# Patient Record
Sex: FEMALE | Race: WHITE
[De-identification: names, ages, dates, MRNs, and addresses within clinical notes are randomized per-mention and may not be internally consistent; named-entity substitution may affect disease eponyms.]

---

## 2017-02-22 ENCOUNTER — HOSPITAL ENCOUNTER (EMERGENCY)
Dept: HOSPITAL 75 - ER | Age: 27
Discharge: HOME | End: 2017-02-22
Payer: SELF-PAY

## 2017-02-22 VITALS — HEIGHT: 66 IN | WEIGHT: 180 LBS | BODY MASS INDEX: 28.93 KG/M2

## 2017-02-22 VITALS — SYSTOLIC BLOOD PRESSURE: 109 MMHG | DIASTOLIC BLOOD PRESSURE: 56 MMHG

## 2017-02-22 DIAGNOSIS — R10.11: ICD-10-CM

## 2017-02-22 DIAGNOSIS — K29.70: Primary | ICD-10-CM

## 2017-02-22 DIAGNOSIS — F17.210: ICD-10-CM

## 2017-02-22 LAB
ALBUMIN SERPL-MCNC: 4 G/DL (ref 3.2–4.5)
ALT SERPL-CCNC: 15 U/L (ref 0–55)
ANION GAP SERPL CALC-SCNC: 12 MMOL/L (ref 5–14)
AST SERPL-CCNC: 14 U/L (ref 5–34)
BASOPHILS # BLD AUTO: 0 10^3/UL (ref 0–0.1)
BASOPHILS NFR BLD AUTO: 0 % (ref 0–10)
BILIRUB SERPL-MCNC: 0.2 MG/DL (ref 0.1–1)
BILIRUB UR QL STRIP: NEGATIVE
BUN SERPL-MCNC: 14 MG/DL (ref 7–18)
BUN/CREAT SERPL: 16
CALCIUM SERPL-MCNC: 8.9 MG/DL (ref 8.5–10.1)
CHLORIDE SERPL-SCNC: 108 MMOL/L (ref 98–107)
CO2 SERPL-SCNC: 22 MMOL/L (ref 21–32)
CREAT SERPL-MCNC: 0.88 MG/DL (ref 0.6–1.3)
EOSINOPHIL # BLD AUTO: 0.1 10^3/UL (ref 0–0.3)
EOSINOPHIL NFR BLD AUTO: 2 % (ref 0–10)
ERYTHROCYTE [DISTWIDTH] IN BLOOD BY AUTOMATED COUNT: 12.6 % (ref 10–14.5)
GFR SERPLBLD BASED ON 1.73 SQ M-ARVRAT: > 60 ML/MIN
GLUCOSE SERPL-MCNC: 89 MG/DL (ref 70–105)
HS C REACTIVE PROTEIN: 0.17 MG/DL (ref 0–0.5)
KETONES UR QL STRIP: NEGATIVE
LEUKOCYTE ESTERASE UR QL STRIP: NEGATIVE
LIPASE SERPL-CCNC: 19 U/L (ref 8–78)
LYMPHOCYTES # BLD AUTO: 1.9 X 10^3 (ref 1–4)
LYMPHOCYTES NFR BLD AUTO: 36 % (ref 12–44)
MCH RBC QN AUTO: 31 PG (ref 25–34)
MCHC RBC AUTO-ENTMCNC: 35 G/DL (ref 32–36)
MCV RBC AUTO: 90 FL (ref 80–99)
MONOCYTES # BLD AUTO: 0.4 X 10^3 (ref 0–1)
MONOCYTES NFR BLD AUTO: 8 % (ref 0–12)
NEUTROPHILS # BLD AUTO: 3 X 10^3 (ref 1.8–7.8)
NEUTROPHILS NFR BLD AUTO: 54 % (ref 42–75)
NITRITE UR QL STRIP: NEGATIVE
PH UR STRIP: 6 [PH] (ref 5–9)
PLATELET # BLD: 140 10^3/UL (ref 130–400)
PMV BLD AUTO: 12 FL (ref 7.4–10.4)
POTASSIUM SERPL-SCNC: 3.7 MMOL/L (ref 3.6–5)
PROT SERPL-MCNC: 6.5 G/DL (ref 6.4–8.2)
PROT UR QL STRIP: NEGATIVE
RBC # BLD AUTO: 4.58 10^6/UL (ref 4.35–5.85)
SODIUM SERPL-SCNC: 142 MMOL/L (ref 135–145)
SP GR UR STRIP: 1.02 (ref 1.02–1.02)
SQUAMOUS #/AREA URNS HPF: (no result) /HPF
UROBILINOGEN UR-MCNC: NORMAL MG/DL
WBC # BLD AUTO: 5.4 10^3/UL (ref 4.3–11)
WBC #/AREA URNS HPF: (no result) /HPF

## 2017-02-22 PROCEDURE — 86141 C-REACTIVE PROTEIN HS: CPT

## 2017-02-22 PROCEDURE — 96361 HYDRATE IV INFUSION ADD-ON: CPT

## 2017-02-22 PROCEDURE — 87088 URINE BACTERIA CULTURE: CPT

## 2017-02-22 PROCEDURE — 96375 TX/PRO/DX INJ NEW DRUG ADDON: CPT

## 2017-02-22 PROCEDURE — 80053 COMPREHEN METABOLIC PANEL: CPT

## 2017-02-22 PROCEDURE — 85025 COMPLETE CBC W/AUTO DIFF WBC: CPT

## 2017-02-22 PROCEDURE — 81000 URINALYSIS NONAUTO W/SCOPE: CPT

## 2017-02-22 PROCEDURE — 76705 ECHO EXAM OF ABDOMEN: CPT

## 2017-02-22 PROCEDURE — 96374 THER/PROPH/DIAG INJ IV PUSH: CPT

## 2017-02-22 PROCEDURE — 36415 COLL VENOUS BLD VENIPUNCTURE: CPT

## 2017-02-22 PROCEDURE — 83690 ASSAY OF LIPASE: CPT

## 2017-02-22 RX ADMIN — TRAMADOL HYDROCHLORIDE STA MG: 50 TABLET, FILM COATED ORAL at 23:20

## 2017-02-22 NOTE — ED GENERAL
General


Chief Complaint:  Abdominal/GI Problems


Stated Complaint:  RIGHT RIB CAGE PAIN


Nursing Triage Note:  


REPORTS RIGHT LOWER RIB PAIN THAT RADIATES TO BACK ET RIGHT SHOULDER STARTING 


YESTERDAY.  REPORTS THE PAIN IS CONSTANT.  PAIN IMPROVED WHEN LAYING ON RIGHT 


SIDE.  INCREASED HEARTBURN OVER THE WEEKEND.  INCREASED FATIGUE.


Nursing Sepsis Screen:  No Definite Risk


Source of Information:  Patient, Other (friend)


Exam Limitations:  No Limitations





History of Present Illness


Time Seen by Provider:  19:42


Initial Comments


25 yo female patient presents to the ED with c/o right lower rib pain radiating 

into the right shoulder blade and right shoulder.  Also complains of epigastric 

pain radiating straight through to the back.  Patient reports initially having 

symptoms on Saturday with improvement Sunday.  Return of symptoms yesterday.  

Decreased appetite.  Has not had anything to eat since yesterday.  Fatigue and 

chills beginning yesterday.  Denies known injury.


Timing/Duration:  24 Hours, Getting Worse


Modifying Factors:  worse with Eating, worse with Movement, worse with Other (

worse palpation, standing upright, lying flat, and riding in the car.)





Allergies and Home Medications


Allergies


Coded Allergies:  


     clindamycin (Unverified  Allergy, Intermediate, SOA, 8/24/10)


     hepatitis B immune globulin (Unverified  Allergy, Mild, 7/28/09)


     Hepatitis B Virus Vaccine (Verified  Allergy, Unknown, 5/31/06)





Home Medications


Famotidine 20 Mg Tablet #20 20 MG PO BID 


   Prescribed by: DESEAN JIMENEZ on 2/22/17 2117


Lorazepam 0.5 Mg Tablet #30 0.5 MG PO Q12H PRN PRN ANXIETY (Reported) 


Ondansetron 8 Mg Tab.rapdis #10 8 MG PO Q6H PRN PRN NAUSEA 


   Prescribed by: DESEAN JIMENEZ on 2/22/17 2117


Tramadol HCl 50 Mg Tablet #14 50 MG PO Q4H PRN PRN PAIN 


   Prescribed by: DESEAN JIMENEZ on 2/22/17 2117





Constitutional:   chillsNo dizziness, No fever,  malaise other (fatigue)


EENTM:   no symptoms reported


Respiratory:   see HPINo cough, No short of breath, No stridor, No wheezing,  

other ((+) rib pain)


Cardiovascular:   no symptoms reported


Gastrointestinal:   see HPI abdominal pain (epigastric)No constipation, No 

diarrhea,  heartburn loss of appetite nauseaNo vomiting


Genitourinary:  No decreased output, No discharge, No dysuria, No frequency, No 

hematuria, No pain


Musculoskeletal:   see HPI back pain joint painNo joint swelling


Skin:   no symptoms reported


Psychiatric/Neurological:   No Symptoms Reported


All Other Systems Reviewed


Negative Unless Noted:  Yes (Negative excepted noted.)





Past Medical-Social-Family Hx


Patient Social History


Alcohol Use:  Rarely Uses


Recreational Drug Use:  No


Smoking Status:  Current Everyday Smoker


Type Used:  Cigarettes


2nd Hand Smoke Exposure:  No


Recent Foreign Travel:  No


Contact w/Someone Who Travel:  No


Recent Infectious Disease Expo:  No


Recent Hopitalizations:  No





Surgeries


HX Surgeries:  Yes (exp lap when 10 years old; lap appy 12/20/09, LYMPH NODE 

EXCISION)


Surgeries:  Appendectomy, Hysterectomy





Respiratory


Hx Respiratory Disorders:  No





Cardiovascular


Hx Cardiac Disorders:  No





Neurological


Hx Neurological Disorders:  No





Reproductive System


Hx Reproductive Disorders:  Yes (severe endometriosis =>hysterectomy)


Sexually Transmitted Disease:  Yes (gonorrhea, chlamydia 2003 or 2004; treated)


GYN History:  Hysterectomy





Genitourinary


Hx Genitourinary Disorders:  No





Gastrointestinal


Hx Gastrointestinal Disorders:  No





Musculoskeletal


Hx Musculoskeletal Disorders:  No





Endocrine


Hx Endocrine Disorders:  No





HEENT


HX ENT Disorders:  No





Psychosocial


Hx Psychiatric Problems:  No





Blood Transfusions


Hx Blood Disorders:  Yes (thrombocytopenia)


Adverse Reaction to a Blood Tr:  No





Reviewed Nursing Assessment


Reviewed/Agree w Nursing PMH:  Yes





Family Medical History


Significant Family History:  No Pertinent Family Hx





Physical Exam


Vital Signs





 Vital Sign - Last 12Hours








 2/22/17 2/22/17





 19:13 23:22


 


Temp 98.2 


 


Pulse 91 


 


Resp 16 


 


B/P 124/87 


 


Pulse Ox  96





Capillary Refill : Less Than 3 Seconds


General Appearance:   No Apparent Distress WD/WN


HEENT:   PERRL/EOMI Pharynx Normal


Neck:   Normal Inspection Supple


Respiratory:   Chest Non Tender (unable to reproduce rib tenderness.) Lungs 

Clear Normal Breath Sounds No Respiratory Distress


Cardiovascular:   Regular Rate, Rhythm No Edema No Murmur Normal Peripheral 

Pulses


Gastrointestinal:   Normal Bowel Sounds Distended (mildly distended, but soft.) 

Guarding (RUQ with a (+) cooper's sign.)No Rebound,  Tenderness (epigastric and 

RUQ tenderness.)


Back:   Normal Inspection No CVA Tenderness Other (rt posterior shoulder/upper 

back ttp.)


Extremity:   Normal Capillary Refill Normal Inspection


Neurologic/Psychiatric:   Alert Oriented x3 Normal Mood/Affect


Skin:   Normal Color Warm/Dry


Comments


RUQ pain worse with lying flat.





Progress/Results/Core Measures


Results/Orders


Lab Results





 Laboratory Tests








Test


  2/22/17


22:15 Range/Units


 


 


Urine Bacteria LARGE H  /HPF


 


Urine Bilirubin NEGATIVE  NEGATIVE  


 


Urine Casts NONE   /LPF


 


Urine Clarity CLEAR   


 


Urine Color YELLOW   


 


Urine Crystals NONE   /LPF


 


Urine Culture Indicated YES   


 


Urine Glucose (UA) NEGATIVE  NEGATIVE  


 


Urine Ketones NEGATIVE  NEGATIVE  


 


Urine Leukocyte Esterase NEGATIVE  NEGATIVE  


 


Urine Mucus SMALL H  /LPF


 


Urine Nitrite NEGATIVE  NEGATIVE  


 


Urine Protein NEGATIVE  NEGATIVE  


 


Urine RBC NONE   /HPF


 


Urine RBC (Auto) NEGATIVE  NEGATIVE  


 


Urine Specific Gravity 1.020  1.016-1.022  


 


Urine Squamous Epithelial


Cells 5-10 


   /HPF


 


 


Urine Urobilinogen NORMAL  NORMAL  MG/DL


 


Urine WBC 2-5   /HPF


 


Urine pH 6  5-9  








Micro Results








 Microbiology


2/22/17 Urine Culture - Final, Complete


          








My Orders





 Orders-DESEAN JIMENEZ


Saline Lock/Iv-Start (2/22/17 20:01)


Cbc With Automated Diff (2/22/17 20:01)


Comprehensive Metabolic Panel (2/22/17 20:01)


Hs C Reactive Protein (2/22/17 20:01)


Lipase (2/22/17 20:01)


Ua Culture If Indicated (2/22/17 20:01)


Ondansetron Injection (Zofran Injectio (2/22/17 20:15)


Ns Iv 1000 Ml (Sodium Chloride 0.9%) (2/22/17 20:01)


Ketorolac Injection (Toradol Injection) (2/22/17 20:01)


Us Gallbladder [id number] (2/22/17 20:01)


Morphine  Injection (Morphine  Injection (2/22/17 22:20)


Rx-Tramadol Hcl (Rx-Ultram) (2/22/17 22:20)


Urine Culture (2/22/17 22:15)





Medications Given in ED





Vital Signs/I&O





Blood Pressure Mean:  99





Diagnostic Imaging





   Diagonstic Imaging:  Ultrasound


   Plain Films/CT/US/NM/MRI:  other (gallbladder)


Comments


FINDINGS: The liver is normal in size, shape and echo texture. There are no 

focal lesions. No intra or extrahepatic biliary dilatation is present. The 

common bile duct is not dilated and measures 4 mm. Gallbladder is incompletely 

distended. There is no evidence of cholelithiasis, gallbladder wall thickening, 

or pericholecystic fluid. The visualized portions of the head and proximal body 

of the pancreas are within normal limits. The distal body and tail of the 

pancreas are not visualized due to overlying bowel gas. There is no ascites. 

The right kidney measures approximately 10.3 cm in length and has a normal 

appearance. The visualized portions of the IVC and aorta are normal. IMPRESSION

: No cholelithiasis or sonographic evidence of acute cholecystitis. Negative 

liver/gallbladder sonogram. Dictated on workstation # [id number]


   Reviewed:  Reviewed by Me (radiology report reviewed by me.)





Departure


Communication


Progress Notes


patient reports feeling better with medications in the ED, but pain is 

returning.  Patient is A/Ox3, NAD.  all laboratory and diagnostic findings 

discussed with the patient.  Plan for discharge home.  Will give patient one 

dose of morphine prior to discharge.  All return precautions were discussed 

with the patient as described in the discharge instructions of this report.  

Patient voices understanding and agrees with the treatment plan.





Impression


Impression:  


 Primary Impression:  


 Right upper quadrant abdominal pain


 Additional Impressions:  


 Epigastric abdominal pain


 Acute gastritis


 Qualified Code:  K29.00 - Acute gastritis without bleeding


Disposition:  01 HOME, SELF-CARE


Condition:  Improved





Departure-Patient Inst.


Referrals:  


GENO PITTS MD (PCP)


Primary Care Physician


Patient Instructions:  Acute Abdomen (Belly Pain), Adult (DC), Gastritis (DC), 

Ulcer and Gastritis Diet





Add. Discharge Instructions:


All discharge instructions reviewed with patient and/or family. Voiced 

understanding.  Medications as instructed.  Tylenol over-the-counter as 

directed for pain.  Clear liquid diet until symptoms improve, then increase 

diet slowly to a low-fat, bland diet.  No NSAIDs, spicy foods, fatty foods, 

carbonated beverages, caffeinated beverages, smoking, secondhand smoke, 

alcohol.  Do not eat within 2 hours of lying down.  Follow-up with the family 

practitioner for recheck and possible need for outpatient had a biliary scan 

versus upper endoscopy.  Call for appointment time tomorrow morning.  Return to 

the emergency department for worsened pain, fever, vomiting, shortness of air, 

difficulty swallowing, difficulty with urination, painful urination, or any 

other concerns.


Scripts


Tramadol HCl 50 Mg [id number] Mg PO Q4H PRN PAIN #14 TAB  Ref 0


   Prov:DESEAN JIMENEZ         2/22/17


Ondansetron (Ondansetron Odt)8 Mg Tab.rapdis8 Mg PO Q6H PRN NAUSEA #10 TAB  Ref 

0


   Prov:DESEAN JIMENEZ PA         2/22/17


Famotidine (Pepcid)20 Mg [id number] Mg PO BID #20 TAB  Ref 0


   Prov:DESEAN JIMENEZ PA         2/22/17


Work/School Note:  Work Release Form   Date Seen in the Emergency Department:  

Feb 22, 2017


   Return to Work:  Feb 23, 2017


   Restrictions:  No Restrictions








DESEAN JIMENEZ PA Feb 22, 2017 19:42


Vital Signs/I&O





 Vital Sign - Last 12Hours








 2/22/17





 19:13


 


Temp 98.2


 


Pulse 91


 


Resp 16


 


B/P 124/87








Blood Pressure Mean:  99





Diagnostic Imaging





   Diagonstic Imaging:  Ultrasound


   Plain Films/CT/US/NM/MRI:  other (gallbladder)


Comments


FINDINGS: The liver is normal in size, shape and echo texture. There are no 

focal lesions. No intra or extrahepatic biliary dilatation is present. The 

common bile duct is not dilated and measures 4 mm. Gallbladder is incompletely 

distended. There is no evidence of cholelithiasis, gallbladder wall thickening, 

or pericholecystic fluid. The visualized portions of the head and proximal body 

of the pancreas are within normal limits. The distal body and tail of the 

pancreas are not visualized due to overlying bowel gas. There is no ascites. 

The right kidney measures approximately 10.3 cm in length and has a normal 

appearance. The visualized portions of the IVC and aorta are normal. IMPRESSION

: No cholelithiasis or sonographic evidence of acute cholecystitis. Negative 

liver/gallbladder sonogram. Dictated on workstation # [id number]


   Reviewed:  Reviewed by Me (radiology report reviewed by me.)





Departure


Impression


Impression:  


 Primary Impression:  


 Right upper quadrant abdominal pain


 Additional Impressions:  


 Epigastric abdominal pain


 Acute gastritis


 Qualified Code:  K29.00 - Acute gastritis without bleeding


Disposition:  01 HOME, SELF-CARE


Condition:  Improved





Departure-Patient Inst.


Referrals:  


GENO PITTS MD (PCP)


Primary Care Physician


Patient Instructions:  Acute Abdomen (Belly Pain), Adult (DC), Gastritis (DC), 

Ulcer and Gastritis Diet





Add. Discharge Instructions:


All discharge instructions reviewed with patient and/or family. Voiced 

understanding.  Medications as instructed.  Tylenol over-the-counter as 

directed for pain.  Clear liquid diet until symptoms improve, then increase 

diet slowly to a low-fat, bland diet.  No NSAIDs, spicy foods, fatty foods, 

carbonated beverages, caffeinated beverages, smoking, secondhand smoke, 

alcohol.  Do not eat within 2 hours of lying down.  Follow-up with the family 

practitioner for recheck and possible need for outpatient had a biliary scan 

versus upper endoscopy.  Call for appointment time tomorrow morning.  Return to 

the emergency department for worsened pain, fever, vomiting, shortness of air, 

difficulty swallowing, difficulty with urination, painful urination, or any 

other concerns.


Scripts


Tramadol HCl 50 Mg [id number] Mg PO Q4H PRN PAIN #14 TAB  Ref 0


   Prov:DESEAN JIMENEZ         2/22/17


Ondansetron (Ondansetron Odt)8 Mg Tab.rapdis8 Mg PO Q6H PRN NAUSEA #10 TAB  Ref 

0


   Prov:DESEAN JIMENEZ         2/22/17


Famotidine (Pepcid)20 Mg [id number] Mg PO BID #20 TAB  Ref 0


   Prov:DESEAN JIMENEZ         2/22/17


Work/School Note:  Work Release Form   Date Seen in the Emergency Department:  

Feb 22, 2017


   Return to Work:  Feb 23, 2017


   Restrictions:  No Restrictions








DESEAN JIMENEZ Feb 22, 2017 19:42

## 2017-02-22 NOTE — XMS REPORT
Continuity of Care Document

 Created on: 2017



DILCIA MORENO

External Reference #: [id number]

: 1990

Sex: Female



Demographics







 [place]  [address]

 

 Home Phone  [phone]

 

 Preferred Language  Unknown

 

 Marital Status  Unknown

 

 Zoroastrian Affiliation  Unknown

 

 Race  Unknown

 

 Ethnic Group  Unknown





Author







 Author  [facility]

 

 Organization  [facility]

 

 Address  Unknown

 

 Phone  Unavailable



                                                      



Allergies

                      





 Active                    Description                    Code                 
   Type                    Severity                    Reaction                
    Onset                    Reported/Identified                    
Relationship to Patient                    Clinical Status                

 

 Yes                    hepatitis B virus vaccine                    [id number]
                    Drug Allergy                    Unknown                    N
/A                                         2006                          
                                

 

 Yes                    hepatitis B immune globulin                    
[id number]                    Drug Allergy                    Mild             
       N/A                                         2009                  
                                        

 

 Yes                    clindamycin                    [id number]              
      Drug Allergy                    Moderate                    SOA          
                               2010                                      
                    



                                                                               
                             



Medications

                                                                               
         



Problems

                      





 Date Dx Coded                    Attending                    Type            
        Code                    Diagnosis                    Diagnosed By      
          

 

 2010                                         Ot                    
789.00                                                          

 

 2010                                         Ot                    
786.05                                                          

 

 2010                                         Ot                    
786.50                                                          

 

 2010                                         Ot                    
786.50                                                          

 

 2010                                         Ot                    
786.52                                                          

 

 10/07/2010                                         Ot                    
300.00                                                          

 

 10/07/2010                                         Ot                    785.1
                                                          

 

 10/07/2010                                         Ot                    
786.05                                                          

 

 10/07/2010                                         Ot                    
786.09                                                          

 

 2011                                         Ot                    
789.04                                                          

 

 2011                                         Ot                    785.6
                                                          

 

 2011                                         Ot                    
730.27                                                          

 

 2011                                         Ot                    
845.10                                                          

 

 2011                                         Ot                    959.7
                                                          

 

 2011                                         Ot                    
E000.8                                                          

 

 2011                                         Ot                    
E849.0                                                          

 

 2011                                         Ot                    
E917.9                                                          

 

 2011                                         Ot                    
922.31                                                          

 

 2011                                         Ot                    
923.10                                                          

 

 2011                                         Ot                    
959.19                                                          

 

 2011                                         Ot                    
E000.8                                                          

 

 2011                                         Ot                    
E821.0                                                          

 

 2012                                         Ot                    526.9
                                                          

 

 2012                                         Ot                    784.0
                                                          

 

 06/10/2012                                         Ot                    511.0
                                                          

 

 06/10/2012                                         Ot                    
786.50                                                          

 

 06/10/2012                                         Ot                    
789.09                                                          

 

 2012                                         Ot                    558.9
                                                          

 

 2012                                         Ot                    
789.02                                                          

 

 2012                                         Ot                    287.5
                                                          

 

 2012                                         Ot                    
V58.69                                                          

 

 10/01/2015                                         Ot                    683  
                                                        

 

 10/01/2015                                         Ot                    
278.02                                                          

 

 10/01/2015                                         Ot                    287.5
                                                          

 

 10/01/2015                                         Ot                    
300.00                                                          

 

 10/01/2015                                         Ot                    305.1
                                                          

 

 10/01/2015                                         Ot                    311  
                                                        

 

 10/01/2015                                         Ot                    696.1
                                                          

 

 10/01/2015                                         Ot                    
V58.69                                                          

 

 10/01/2015                                         Ot                    287.5
                                                          

 

 10/01/2015                                         Ot                    
V58.69                                                          

 

 10/01/2015                                         Ot                    
238.71                                                          

 

 10/01/2015                                         Ot                    287.5
                                                          

 

 10/01/2015                                         Ot                    
V58.69                                                          

 

 10/01/2015                    GENO PITTS MD                    Ot       
             782.62                                                          

 

 10/01/2015                    GISSELLE CHIU, GENO HAYNES                    Ot       
             780.60                                                          

 

 10/01/2015                                         Ot                    683  
                                                        

 

 10/01/2015                                         Ot                    
278.02                                                          

 

 10/01/2015                                         Ot                    287.5
                                                          

 

 10/01/2015                                         Ot                    
300.00                                                          

 

 10/01/2015                                         Ot                    305.1
                                                          

 

 10/01/2015                                         Ot                    311  
                                                        

 

 10/01/2015                                         Ot                    696.1
                                                          

 

 10/01/2015                                         Ot                    
V58.69                                                          

 

 10/01/2015                                         Ot                    287.5
                                                          

 

 10/01/2015                                         Ot                    
V58.69                                                          

 

 10/01/2015                                         Ot                    
238.71                                                          

 

 10/01/2015                                         Ot                    287.5
                                                          

 

 10/01/2015                                         Ot                    
V58.69                                                          

 

 10/01/2015                    GENO PITTS MD                    Ot       
             782.62                                                          

 

 10/01/2015                    GISSELLE CHIU, GENO HAYNES                    Ot       
             780.60                                                          

 

 10/05/2015                    GISSELLE CHIU, GENO HAYNES                    Ot       
             R63.5                                                          

 

 12/10/2015                    GISSELLE CHIU, GENO HAYNES                    Ot       
             782.62                                                          

 

 12/10/2015                    GISSELLE CHIU, GENO HAYNES                    Ot       
             780.60                                                          

 

 12/10/2015                    GENO PITTS MD                    Ot       
             R63.5                                                          

 

 12/10/2015                    GISSELLE CHIU, GENO HAYNES                    Ot       
             782.62                                                          

 

 12/10/2015                    GISSELLE CHIU, GENO HAYNES                    Ot       
             780.60                                                          

 

 2015                    GENO PITTS MD                    Ot       
             L40.0                                                          

 

 2015                    GENO PITTS MD                    Ot       
             R63.5                                                          

 

 2016                    GENO PITTS MD                    Ot       
             E66.9                                                          

 

 2016                    GENO PITTS MD                    Ot       
             782.62                    FLUSHING                                
     

 

 2016                    GENO PITTS MD                    Ot       
             780.60                    FEVER, UNSPECIFIED                      
               

 

 2016                    GENO PITTS MD                    Ot       
             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                       
              



                                                                               
                                                                               
                                                                               
                                                                               
                                                                               
                                                                               
                                                                               
                                                                               
                                                                               
                                                                               
                  



Procedures

                                                                               
         



Results

                                                                    



Encounters

                      





 ACCT No.                    Visit Date/Time                    Discharge      
              Status                    Pt. Type                    Provider   
                 Facility                    Loc./Unit                    
Complaint                

 

 [id number]                    10/01/2015 10:42:00                    10/01/
2015 23:59:59                    [facility]                    Outpatient             
       GENO PITTS MD                    [facility] 
                   LAB                                     

 

 [id number]                    10/20/2014 13:34:00                    10/20/
2014 23:59:59                    [facility]                    Outpatient             
       GENO PITTS MD                    [facility] 
                   LAB                                     

 

 [id number]                    2014 07:58:00                    2014 23:59:59                    [facility]                    Outpatient             
       GENO PITTS MD                    [facility] 
                   LAB                                     

 

 [id number]                    2016 08:25:00                           
              ACT                    Outpatient                    GENO PITTS MD                    [facility]                    
LAB                                     

 

 [id number]                    12/10/2015 08:26:00                           
              ACT                    Outpatient                    GENO PITTS MD                    [facility]                    
LAB                                     

 

 [id number]                    10/01/2015 10:43:00                           
                                   Document Registration                       
                                                                             

 

 [id number]                    11/15/2012 00:00:00                           
                                   Document Registration                       
                                                                             

 

 [id number]                    2012 10:44:00                           
                                   Document Registration                       
                                                                             

 

 [id number]                    2012 09:56:00                           
                                   Document Registration                       
                                                                             

 

 [id number]                    2012 15:10:00                           
                                   Document Registration                       
                                                                             

 

 [id number]                    06/10/2012 22:02:00                           
                                   Document Registration                       
                                                                             

 

 [id number]                    2012 08:55:00                           
                                   Document Registration                       
                                                                             

 

 [id number]                    2012 21:46:00                           
                                   Document Registration                       
                                                                             

 

 [id number]                    2012 08:07:00                           
                                   Document Registration                       
                                                                             

 

 [id number]                    2012 08:13:00                           
                                   Document Registration                       
                                                                             

 

 [id number]                    2011 15:49:00                           
                                   Document Registration                       
                                                                             

 

 [id number]                    2011 00:17:00                           
                                   Document Registration                       
                                                                             

 

 [id number]                    2011 12:23:00                           
                                   Document Registration                       
                                                                             

 

 [id number]                    2011 10:45:00                           
                                   Document Registration                       
                                                                             

 

 [id number]                    2011 03:30:00                           
                                   Document Registration                       
                                                                             

 

 [id number]                    10/07/2010 18:02:00                           
                                   Document Registration                       
                                                                             

 

 [id number]                    2010 23:34:00                           
                                   Document Registration                       
                                                                             

 

 [id number]                    2010 20:16:00                           
                                   Document Registration                       
                                                                             

 

 [id number]                    2010 21:52:00                           
                                   Document Registration

## 2017-02-22 NOTE — DIAGNOSTIC IMAGING REPORT
INDICATION: Right upper quadrant abdominal pain



COMPARISON:  None



TECHNIQUE:  Liver/gallbladder ultrasound.



FINDINGS:  The liver is normal in size, shape and echo texture.

There are no focal lesions. No intra or extrahepatic biliary

dilatation is present. The common bile duct is not dilated and

measures 4 mm.



Gallbladder is incompletely distended. There is no evidence of

cholelithiasis, gallbladder wall thickening, or pericholecystic

fluid.



The visualized portions of the head and proximal body of the

pancreas are within normal limits.  The distal body and tail of

the pancreas are not visualized due to overlying bowel gas.

There is no ascites.



The right kidney measures approximately 10.3 cm in length and

has a normal appearance. The visualized portions of the IVC and

aorta are normal.



IMPRESSION:  No cholelithiasis or sonographic evidence of acute

cholecystitis. Negative liver/gallbladder sonogram.



Dictated by:



Dictated on workstation # [id number]

## 2017-07-25 ENCOUNTER — HOSPITAL ENCOUNTER (OUTPATIENT)
Dept: HOSPITAL 75 - RAD | Age: 27
End: 2017-07-25
Attending: FAMILY MEDICINE
Payer: COMMERCIAL

## 2017-07-25 DIAGNOSIS — F45.8: Primary | ICD-10-CM

## 2017-07-25 DIAGNOSIS — E66.9: ICD-10-CM

## 2017-07-25 PROCEDURE — 76536 US EXAM OF HEAD AND NECK: CPT

## 2017-07-25 PROCEDURE — 84443 ASSAY THYROID STIM HORMONE: CPT

## 2017-07-25 PROCEDURE — 36415 COLL VENOUS BLD VENIPUNCTURE: CPT

## 2017-07-25 NOTE — DIAGNOSTIC IMAGING REPORT
PROCEDURE: US Thyroid.



TECHNIQUE: Multiple real-time grayscale images were obtained of

the thyroid in various projections.



INDICATION: Globus sensation.



FINDINGS: Right lobe of the thyroid measures 4.8 x 1.4 x 1.3 cm.

The left lobe measures 4.2 x 1.3 x 1.3 cm. Both lobes are

homogeneous in appearance. There are no nodules. There is no

hypervascularity with Doppler imaging.



IMPRESSION: Normal thyroid ultrasound.



Dictated by: 



  Dictated on workstation # [id number]

## 2017-10-10 ENCOUNTER — HOSPITAL ENCOUNTER (OUTPATIENT)
Dept: HOSPITAL 75 - LAB | Age: 27
End: 2017-10-10
Attending: NURSE PRACTITIONER
Payer: COMMERCIAL

## 2017-10-10 DIAGNOSIS — L65.9: ICD-10-CM

## 2017-10-10 DIAGNOSIS — R53.83: Primary | ICD-10-CM

## 2017-10-10 LAB
ANION GAP SERPL CALC-SCNC: 10 MMOL/L (ref 5–14)
BUN SERPL-MCNC: 16 MG/DL (ref 7–18)
BUN/CREAT SERPL: 21
CALCIUM SERPL-MCNC: 9 MG/DL (ref 8.5–10.1)
CHLORIDE SERPL-SCNC: 105 MMOL/L (ref 98–107)
CO2 SERPL-SCNC: 23 MMOL/L (ref 21–32)
CREAT SERPL-MCNC: 0.77 MG/DL (ref 0.6–1.3)
ERYTHROCYTE [DISTWIDTH] IN BLOOD BY AUTOMATED COUNT: 12.6 % (ref 10–14.5)
GFR SERPLBLD BASED ON 1.73 SQ M-ARVRAT: > 60 ML/MIN
GLUCOSE SERPL-MCNC: 106 MG/DL (ref 70–105)
MCH RBC QN AUTO: 31 PG (ref 25–34)
MCHC RBC AUTO-ENTMCNC: 34 G/DL (ref 32–36)
MCV RBC AUTO: 90 FL (ref 80–99)
PLATELET # BLD: 150 10^3/UL (ref 130–400)
PMV BLD AUTO: 12.2 FL (ref 7.4–10.4)
POTASSIUM SERPL-SCNC: 3.3 MMOL/L (ref 3.6–5)
RBC # BLD AUTO: 4.88 10^6/UL (ref 4.35–5.85)
SODIUM SERPL-SCNC: 138 MMOL/L (ref 135–145)
TSH SERPL-ACNC: 0.85 UIU/ML (ref 0.35–4.94)
WBC # BLD AUTO: 5.8 10^3/UL (ref 4.3–11)

## 2017-10-10 PROCEDURE — 86376 MICROSOMAL ANTIBODY EACH: CPT

## 2017-10-10 PROCEDURE — 80048 BASIC METABOLIC PNL TOTAL CA: CPT

## 2017-10-10 PROCEDURE — 36415 COLL VENOUS BLD VENIPUNCTURE: CPT

## 2017-10-10 PROCEDURE — 84443 ASSAY THYROID STIM HORMONE: CPT

## 2017-10-10 PROCEDURE — 85027 COMPLETE CBC AUTOMATED: CPT

## 2017-10-10 PROCEDURE — 83540 ASSAY OF IRON: CPT

## 2017-10-10 PROCEDURE — 82728 ASSAY OF FERRITIN: CPT

## 2017-10-11 LAB
%SAT TOTAL IRON BINDING CAPIC: 19 % (ref 15–50)
TIBC SERPL-MCNC: 336 UG/DL (ref 280–380)
UIBC SERPL-MCNC: 272 UG/DL (ref 55–450)

## 2017-10-12 LAB
FERRITIN SERPL-MCNC: 103 NG/ML (ref 15–150)
Lab: 18.28 UNITS (ref 0–100)

## 2018-07-24 ENCOUNTER — HOSPITAL ENCOUNTER (OUTPATIENT)
Dept: HOSPITAL 75 - LAB | Age: 28
End: 2018-07-24
Payer: COMMERCIAL

## 2018-07-24 DIAGNOSIS — L40.0: Primary | ICD-10-CM

## 2018-07-24 DIAGNOSIS — Z79.899: ICD-10-CM

## 2018-07-24 LAB
ALBUMIN SERPL-MCNC: 4.3 GM/DL (ref 3.2–4.5)
ALP SERPL-CCNC: 37 U/L (ref 40–136)
ALT SERPL-CCNC: 9 U/L (ref 0–55)
BASOPHILS # BLD AUTO: 0 10^3/UL (ref 0–0.1)
BASOPHILS NFR BLD AUTO: 0 % (ref 0–10)
BILIRUB SERPL-MCNC: 0.5 MG/DL (ref 0.1–1)
BUN/CREAT SERPL: 17
CALCIUM SERPL-MCNC: 8.9 MG/DL (ref 8.5–10.1)
CHLORIDE SERPL-SCNC: 108 MMOL/L (ref 98–107)
CO2 SERPL-SCNC: 22 MMOL/L (ref 21–32)
CREAT SERPL-MCNC: 0.77 MG/DL (ref 0.6–1.3)
EOSINOPHIL # BLD AUTO: 0 10^3/UL (ref 0–0.3)
EOSINOPHIL NFR BLD AUTO: 1 % (ref 0–10)
ERYTHROCYTE [DISTWIDTH] IN BLOOD BY AUTOMATED COUNT: 12.6 % (ref 10–14.5)
GFR SERPLBLD BASED ON 1.73 SQ M-ARVRAT: > 60 ML/MIN
GLUCOSE SERPL-MCNC: 84 MG/DL (ref 70–105)
HCT VFR BLD CALC: 39 % (ref 35–52)
HGB BLD-MCNC: 13.8 G/DL (ref 11.5–16)
LYMPHOCYTES # BLD AUTO: 1.4 X 10^3 (ref 1–4)
LYMPHOCYTES NFR BLD AUTO: 31 % (ref 12–44)
MANUAL DIFFERENTIAL PERFORMED BLD QL: NO
MCH RBC QN AUTO: 32 PG (ref 25–34)
MCHC RBC AUTO-ENTMCNC: 35 G/DL (ref 32–36)
MCV RBC AUTO: 90 FL (ref 80–99)
MONOCYTES # BLD AUTO: 0.3 X 10^3 (ref 0–1)
MONOCYTES NFR BLD AUTO: 7 % (ref 0–12)
NEUTROPHILS # BLD AUTO: 2.8 X 10^3 (ref 1.8–7.8)
NEUTROPHILS NFR BLD AUTO: 62 % (ref 42–75)
PLATELET # BLD: 122 10^3/UL (ref 130–400)
PMV BLD AUTO: 11.8 FL (ref 7.4–10.4)
POTASSIUM SERPL-SCNC: 3.7 MMOL/L (ref 3.6–5)
PROT SERPL-MCNC: 7.3 GM/DL (ref 6.4–8.2)
RBC # BLD AUTO: 4.35 10^6/UL (ref 4.35–5.85)
SODIUM SERPL-SCNC: 137 MMOL/L (ref 135–145)
WBC # BLD AUTO: 4.5 10^3/UL (ref 4.3–11)

## 2018-07-24 PROCEDURE — 80053 COMPREHEN METABOLIC PANEL: CPT

## 2018-07-24 PROCEDURE — 86480 TB TEST CELL IMMUN MEASURE: CPT

## 2018-07-24 PROCEDURE — 36415 COLL VENOUS BLD VENIPUNCTURE: CPT

## 2018-07-24 PROCEDURE — 85025 COMPLETE CBC W/AUTO DIFF WBC: CPT

## 2018-07-25 ENCOUNTER — HOSPITAL ENCOUNTER (OUTPATIENT)
Dept: HOSPITAL 75 - LAB | Age: 28
End: 2018-07-25
Payer: COMMERCIAL

## 2018-07-25 DIAGNOSIS — L40.0: Primary | ICD-10-CM

## 2018-07-25 DIAGNOSIS — Z79.899: ICD-10-CM

## 2018-07-25 PROCEDURE — 86480 TB TEST CELL IMMUN MEASURE: CPT

## 2020-08-27 ENCOUNTER — HOSPITAL ENCOUNTER (OUTPATIENT)
Dept: HOSPITAL 75 - REHAB | Age: 30
LOS: 62 days | Discharge: HOME | End: 2020-10-28
Attending: ORTHOPAEDIC SURGERY
Payer: COMMERCIAL
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
## 2020-08-27 DIAGNOSIS — S93.492D: Primary | ICD-10-CM

## 2021-12-23 ENCOUNTER — HOSPITAL ENCOUNTER (OUTPATIENT)
Dept: HOSPITAL 75 - PREOP | Age: 31
End: 2021-12-23
Attending: OTOLARYNGOLOGY
Payer: COMMERCIAL

## 2021-12-23 VITALS — SYSTOLIC BLOOD PRESSURE: 104 MMHG | DIASTOLIC BLOOD PRESSURE: 59 MMHG

## 2021-12-23 VITALS — BODY MASS INDEX: 29.9 KG/M2 | HEIGHT: 65 IN | WEIGHT: 179.46 LBS

## 2021-12-23 DIAGNOSIS — Z01.812: Primary | ICD-10-CM

## 2021-12-23 DIAGNOSIS — J35.01: ICD-10-CM

## 2021-12-23 DIAGNOSIS — Z20.822: ICD-10-CM

## 2021-12-23 LAB
BASOPHILS # BLD AUTO: 0 10^3/UL (ref 0–0.1)
BASOPHILS NFR BLD AUTO: 0 % (ref 0–10)
BASOPHILS NFR BLD MANUAL: 0 %
BUN/CREAT SERPL: 16
CALCIUM SERPL-MCNC: 8.8 MG/DL (ref 8.5–10.1)
CHLORIDE SERPL-SCNC: 108 MMOL/L (ref 98–107)
CO2 SERPL-SCNC: 21 MMOL/L (ref 21–32)
CREAT SERPL-MCNC: 0.75 MG/DL (ref 0.6–1.3)
EOSINOPHIL # BLD AUTO: 0.1 10^3/UL (ref 0–0.3)
EOSINOPHIL NFR BLD AUTO: 2 % (ref 0–10)
EOSINOPHIL NFR BLD MANUAL: 2 %
GFR SERPLBLD BASED ON 1.73 SQ M-ARVRAT: 89 ML/MIN
GLUCOSE SERPL-MCNC: 97 MG/DL (ref 70–105)
HCT VFR BLD CALC: 40 % (ref 35–52)
HGB BLD-MCNC: 13.6 G/DL (ref 11.5–16)
LYMPHOCYTES # BLD AUTO: 1.4 10^3/UL (ref 1–4)
LYMPHOCYTES NFR BLD AUTO: 35 % (ref 12–44)
MCH RBC QN AUTO: 31 PG (ref 25–34)
MCHC RBC AUTO-ENTMCNC: 34 G/DL (ref 32–36)
MCV RBC AUTO: 91 FL (ref 80–99)
MONOCYTES # BLD AUTO: 0.2 10^3/UL (ref 0–1)
MONOCYTES NFR BLD AUTO: 6 % (ref 0–12)
MONOCYTES NFR BLD: 5 %
NEUTROPHILS # BLD AUTO: 2.4 10^3/UL (ref 1.8–7.8)
NEUTROPHILS NFR BLD AUTO: 58 % (ref 42–75)
NEUTS BAND NFR BLD MANUAL: 59 %
NEUTS BAND NFR BLD: 1 %
PLATELET # BLD: 153 10^3/UL (ref 130–400)
PMV BLD AUTO: 12.2 FL (ref 9–12.2)
POTASSIUM SERPL-SCNC: 3.6 MMOL/L (ref 3.6–5)
RBC MORPH BLD: NORMAL
SODIUM SERPL-SCNC: 139 MMOL/L (ref 135–145)
VARIANT LYMPHS NFR BLD MANUAL: 33 %
WBC # BLD AUTO: 4.1 10^3/UL (ref 4.3–11)

## 2021-12-23 PROCEDURE — 87081 CULTURE SCREEN ONLY: CPT

## 2021-12-23 PROCEDURE — 85007 BL SMEAR W/DIFF WBC COUNT: CPT

## 2021-12-23 PROCEDURE — 87635 SARS-COV-2 COVID-19 AMP PRB: CPT

## 2021-12-23 PROCEDURE — 36415 COLL VENOUS BLD VENIPUNCTURE: CPT

## 2021-12-23 PROCEDURE — 80048 BASIC METABOLIC PNL TOTAL CA: CPT

## 2021-12-23 PROCEDURE — 85027 COMPLETE CBC AUTOMATED: CPT

## 2021-12-28 ENCOUNTER — HOSPITAL ENCOUNTER (OUTPATIENT)
Dept: HOSPITAL 75 - SDC | Age: 31
Discharge: HOME | End: 2021-12-28
Attending: OTOLARYNGOLOGY
Payer: COMMERCIAL

## 2021-12-28 VITALS — DIASTOLIC BLOOD PRESSURE: 65 MMHG | SYSTOLIC BLOOD PRESSURE: 100 MMHG

## 2021-12-28 VITALS — DIASTOLIC BLOOD PRESSURE: 77 MMHG | SYSTOLIC BLOOD PRESSURE: 120 MMHG

## 2021-12-28 VITALS — SYSTOLIC BLOOD PRESSURE: 105 MMHG | DIASTOLIC BLOOD PRESSURE: 65 MMHG

## 2021-12-28 VITALS — DIASTOLIC BLOOD PRESSURE: 74 MMHG | SYSTOLIC BLOOD PRESSURE: 103 MMHG

## 2021-12-28 VITALS — WEIGHT: 179.46 LBS | BODY MASS INDEX: 29.9 KG/M2 | HEIGHT: 65 IN

## 2021-12-28 VITALS — SYSTOLIC BLOOD PRESSURE: 105 MMHG | DIASTOLIC BLOOD PRESSURE: 70 MMHG

## 2021-12-28 VITALS — DIASTOLIC BLOOD PRESSURE: 81 MMHG | SYSTOLIC BLOOD PRESSURE: 122 MMHG

## 2021-12-28 VITALS — DIASTOLIC BLOOD PRESSURE: 72 MMHG | SYSTOLIC BLOOD PRESSURE: 113 MMHG

## 2021-12-28 VITALS — DIASTOLIC BLOOD PRESSURE: 72 MMHG | SYSTOLIC BLOOD PRESSURE: 99 MMHG

## 2021-12-28 VITALS — SYSTOLIC BLOOD PRESSURE: 114 MMHG | DIASTOLIC BLOOD PRESSURE: 72 MMHG

## 2021-12-28 DIAGNOSIS — Z79.899: ICD-10-CM

## 2021-12-28 DIAGNOSIS — J35.01: Primary | ICD-10-CM

## 2021-12-28 DIAGNOSIS — E03.9: ICD-10-CM

## 2021-12-28 DIAGNOSIS — J45.909: ICD-10-CM

## 2021-12-28 DIAGNOSIS — K31.89: ICD-10-CM

## 2021-12-28 DIAGNOSIS — K21.00: ICD-10-CM

## 2021-12-28 PROCEDURE — 88304 TISSUE EXAM BY PATHOLOGIST: CPT

## 2021-12-28 PROCEDURE — 87081 CULTURE SCREEN ONLY: CPT

## 2021-12-28 NOTE — ANESTHESIA-GENERAL POST-OP
General


Patient Condition


Mental Status/LOC:  Same as Preop


Cardiovascular:  Satisfactory


Nausea/Vomiting:  Absent


Respiratory:  Satisfactory


Pain:  Controlled


Complications:  Absent





Post Op Complications


Complications


None





Follow Up Care/Instructions


Patient Instructions


None needed.





Anesthesia/Patient Condition


Patient Condition


Patient is doing well, no complaints, stable vital signs, no apparent adverse 

anesthesia problems.   


No complications reported per nursing.











MELVA GOOD CRNA            Dec 28, 2021 13:55

## 2021-12-28 NOTE — PROGRESS NOTE-PRE OPERATIVE
Pre-Operative Progress Note


H&P Reviewed


The H&P was reviewed, patient examined and no changes noted.


Date Seen by Provider:  Dec 28, 2021


Time Seen by Provider:  10:30


Date H&P Reviewed:  Dec 28, 2021


Time H&P Reviewed:  10:30


Pre-Operative Diagnosis:  Rec Tons/ Tonsillar Hypertrophy











MAXI JIMÉNEZ MD             Dec 28, 2021 10:36

## 2021-12-28 NOTE — PROGRESS NOTE-POST OPERATIVE
Post-Operative Progess Note


Surgeon (s)/Assistant (s)


Surgeon


MAXI JIMÉNEZ MD


Assistant


n/a





Pre-Operative Diagnosis


Rec Tons/ Tonsillar Hypertrophy





Post-Operative Diagnosis


same





Post-Op Procedure Note


Date of Procedure:  Dec 28, 2021


Name of Procedure Performed:  


Tonsillectomy


Description & Findings


Description and Findings:





n/a


Anesthesia Type


get


Estimated Blood Loss


minimal


Packing


none.


Specimen(s) collected/removed


tonsils











MAXI JIMÉNEZ MD             Dec 28, 2021 11:09

## 2022-07-13 ENCOUNTER — HOSPITAL ENCOUNTER (OUTPATIENT)
Dept: HOSPITAL 75 - LAB | Age: 32
End: 2022-07-13
Payer: COMMERCIAL

## 2022-07-13 DIAGNOSIS — L40.0: Primary | ICD-10-CM

## 2022-07-13 LAB
ALBUMIN SERPL-MCNC: 4.3 GM/DL (ref 3.2–4.5)
ALP SERPL-CCNC: 37 U/L (ref 40–136)
ALT SERPL-CCNC: 17 U/L (ref 0–55)
BASOPHILS # BLD AUTO: 0 10^3/UL (ref 0–0.1)
BASOPHILS NFR BLD AUTO: 1 % (ref 0–10)
BILIRUB SERPL-MCNC: 0.4 MG/DL (ref 0.1–1)
BUN/CREAT SERPL: 21
CALCIUM SERPL-MCNC: 8.9 MG/DL (ref 8.5–10.1)
CHLORIDE SERPL-SCNC: 108 MMOL/L (ref 98–107)
CO2 SERPL-SCNC: 20 MMOL/L (ref 21–32)
CREAT SERPL-MCNC: 0.78 MG/DL (ref 0.6–1.3)
EOSINOPHIL # BLD AUTO: 0.1 10^3/UL (ref 0–0.3)
EOSINOPHIL NFR BLD AUTO: 1 % (ref 0–10)
GFR SERPLBLD BASED ON 1.73 SQ M-ARVRAT: 102 ML/MIN
GLUCOSE SERPL-MCNC: 88 MG/DL (ref 70–105)
HCT VFR BLD CALC: 40 % (ref 35–52)
HGB BLD-MCNC: 13.6 G/DL (ref 11.5–16)
LYMPHOCYTES # BLD AUTO: 1.5 10^3/UL (ref 1–4)
LYMPHOCYTES NFR BLD AUTO: 30 % (ref 12–44)
MANUAL DIFFERENTIAL PERFORMED BLD QL: NO
MCH RBC QN AUTO: 30 PG (ref 25–34)
MCHC RBC AUTO-ENTMCNC: 34 G/DL (ref 32–36)
MCV RBC AUTO: 90 FL (ref 80–99)
MONOCYTES # BLD AUTO: 0.3 10^3/UL (ref 0–1)
MONOCYTES NFR BLD AUTO: 7 % (ref 0–12)
NEUTROPHILS # BLD AUTO: 3 10^3/UL (ref 1.8–7.8)
NEUTROPHILS NFR BLD AUTO: 61 % (ref 42–75)
PLATELET # BLD: 145 10^3/UL (ref 130–400)
PMV BLD AUTO: 11.9 FL (ref 9–12.2)
POTASSIUM SERPL-SCNC: 3.7 MMOL/L (ref 3.6–5)
PROT SERPL-MCNC: 6.9 GM/DL (ref 6.4–8.2)
SODIUM SERPL-SCNC: 140 MMOL/L (ref 135–145)
WBC # BLD AUTO: 4.8 10^3/UL (ref 4.3–11)

## 2022-07-13 PROCEDURE — 36415 COLL VENOUS BLD VENIPUNCTURE: CPT

## 2022-07-13 PROCEDURE — 86480 TB TEST CELL IMMUN MEASURE: CPT

## 2022-07-13 PROCEDURE — 85025 COMPLETE CBC W/AUTO DIFF WBC: CPT

## 2022-07-13 PROCEDURE — 80053 COMPREHEN METABOLIC PANEL: CPT

## 2022-11-09 ENCOUNTER — HOSPITAL ENCOUNTER (OUTPATIENT)
Dept: HOSPITAL 75 - CARD | Age: 32
Discharge: HOME | End: 2022-11-09
Attending: INTERNAL MEDICINE
Payer: COMMERCIAL

## 2022-11-09 VITALS — DIASTOLIC BLOOD PRESSURE: 74 MMHG | SYSTOLIC BLOOD PRESSURE: 93 MMHG

## 2022-11-09 VITALS — SYSTOLIC BLOOD PRESSURE: 103 MMHG | DIASTOLIC BLOOD PRESSURE: 62 MMHG

## 2022-11-09 VITALS — DIASTOLIC BLOOD PRESSURE: 68 MMHG | SYSTOLIC BLOOD PRESSURE: 102 MMHG

## 2022-11-09 VITALS — DIASTOLIC BLOOD PRESSURE: 62 MMHG | SYSTOLIC BLOOD PRESSURE: 102 MMHG

## 2022-11-09 VITALS — DIASTOLIC BLOOD PRESSURE: 64 MMHG | SYSTOLIC BLOOD PRESSURE: 97 MMHG

## 2022-11-09 VITALS — DIASTOLIC BLOOD PRESSURE: 70 MMHG | SYSTOLIC BLOOD PRESSURE: 105 MMHG

## 2022-11-09 VITALS — SYSTOLIC BLOOD PRESSURE: 108 MMHG | DIASTOLIC BLOOD PRESSURE: 74 MMHG

## 2022-11-09 VITALS — SYSTOLIC BLOOD PRESSURE: 108 MMHG | DIASTOLIC BLOOD PRESSURE: 79 MMHG

## 2022-11-09 VITALS — DIASTOLIC BLOOD PRESSURE: 64 MMHG | SYSTOLIC BLOOD PRESSURE: 88 MMHG

## 2022-11-09 VITALS — SYSTOLIC BLOOD PRESSURE: 110 MMHG | DIASTOLIC BLOOD PRESSURE: 82 MMHG

## 2022-11-09 VITALS — DIASTOLIC BLOOD PRESSURE: 85 MMHG | SYSTOLIC BLOOD PRESSURE: 102 MMHG

## 2022-11-09 VITALS — DIASTOLIC BLOOD PRESSURE: 80 MMHG | SYSTOLIC BLOOD PRESSURE: 112 MMHG

## 2022-11-09 VITALS — DIASTOLIC BLOOD PRESSURE: 82 MMHG | SYSTOLIC BLOOD PRESSURE: 104 MMHG

## 2022-11-09 VITALS — SYSTOLIC BLOOD PRESSURE: 109 MMHG | DIASTOLIC BLOOD PRESSURE: 77 MMHG

## 2022-11-09 VITALS — DIASTOLIC BLOOD PRESSURE: 79 MMHG | SYSTOLIC BLOOD PRESSURE: 109 MMHG

## 2022-11-09 VITALS — SYSTOLIC BLOOD PRESSURE: 108 MMHG | DIASTOLIC BLOOD PRESSURE: 62 MMHG

## 2022-11-09 VITALS — SYSTOLIC BLOOD PRESSURE: 111 MMHG | DIASTOLIC BLOOD PRESSURE: 81 MMHG

## 2022-11-09 VITALS — DIASTOLIC BLOOD PRESSURE: 71 MMHG | SYSTOLIC BLOOD PRESSURE: 108 MMHG

## 2022-11-09 VITALS — SYSTOLIC BLOOD PRESSURE: 102 MMHG | DIASTOLIC BLOOD PRESSURE: 68 MMHG

## 2022-11-09 VITALS — BODY MASS INDEX: 29.9 KG/M2 | HEIGHT: 65 IN | WEIGHT: 179.46 LBS

## 2022-11-09 VITALS — DIASTOLIC BLOOD PRESSURE: 68 MMHG | SYSTOLIC BLOOD PRESSURE: 108 MMHG

## 2022-11-09 VITALS — SYSTOLIC BLOOD PRESSURE: 105 MMHG | DIASTOLIC BLOOD PRESSURE: 90 MMHG

## 2022-11-09 VITALS — SYSTOLIC BLOOD PRESSURE: 97 MMHG | DIASTOLIC BLOOD PRESSURE: 76 MMHG

## 2022-11-09 VITALS — DIASTOLIC BLOOD PRESSURE: 68 MMHG | SYSTOLIC BLOOD PRESSURE: 109 MMHG

## 2022-11-09 VITALS — SYSTOLIC BLOOD PRESSURE: 107 MMHG | DIASTOLIC BLOOD PRESSURE: 81 MMHG

## 2022-11-09 DIAGNOSIS — R00.2: ICD-10-CM

## 2022-11-09 DIAGNOSIS — R00.1: ICD-10-CM

## 2022-11-09 DIAGNOSIS — R55: Primary | ICD-10-CM

## 2022-11-09 PROCEDURE — 93306 TTE W/DOPPLER COMPLETE: CPT

## 2022-11-09 PROCEDURE — 93660 TILT TABLE EVALUATION: CPT

## 2022-11-09 NOTE — CARDIOLOGY TILT TABLE TEST
Cardiology-Tilt Table Test


Tilt Table Test


Date


11/9/22


Baseline Vitals





Vital Signs








  Date Time  Temp Pulse Resp B/P (MAP) Pulse Ox O2 Delivery O2 Flow Rate FiO2


 


11/9/22 10:27 36.4 52 16 109/79 (89) 97 Room Air  








Vital Signs





                          VS - Last 72 Hours, by Label








 11/9/22 11/9/22 11/9/22 11/9/22





 10:27 10:32 10:34 10:35


 


Temp 36.4   


 


Pulse 52 55 78 80


 


Resp 16 18 19 19


 


B/P (MAP) 109/79 (89) 103/62 (76) 97/76 (83) 111/81 (91)


 


Pulse Ox 97 97 98 98


 


O2 Delivery Room Air Room Air Room Air Room Air


 


    





 11/9/22 11/9/22 11/9/22 11/9/22





 10:36 10:37 10:38 10:39


 


Pulse 80 91 80 89


 


Resp 19 19 19 13


 


B/P (MAP) 111/81 (91) 104/82 (89) 112/80 (91) 108/79 (89)


 


Pulse Ox 98 99 99 98


 


O2 Delivery Room Air Room Air Room Air Room Air





 11/9/22 11/9/22 11/9/22 11/9/22





 10:40 10:41 10:42 10:43


 


Pulse 85 89 92 96


 


Resp 17 12 12 15


 


B/P (MAP) 112/80 (91) 107/81 (90) 109/77 (88) 110/82 (91)


 


Pulse Ox 98 98 98 97


 


O2 Delivery Room Air Room Air Room Air Room Air





 11/9/22 11/9/22 11/9/22 11/9/22





 10:44 10:45 10:46 10:47


 


Pulse 67 71 95 96


 


Resp 14 14 14 12


 


B/P (MAP) 109/68 (82) 105/70 (82) 108/74 (85) 102/62 (75)


 


Pulse Ox 98 95 95 95


 


O2 Delivery Room Air Room Air Room Air Room Air





 11/9/22 11/9/22 11/9/22 11/9/22





 10:48 10:49 10:50 10:51


 


Pulse 96 125 137 137


 


Resp 13 14 13 9


 


B/P (MAP) 108/62 (77) 102/68 (79) 102/68 (79) 97/64 (75)


 


Pulse Ox 94 98 94 92


 


O2 Delivery Room Air Room Air Room Air Room Air





 11/9/22 11/9/22 11/9/22 11/9/22





 10:52 10:53 10:54 10:55


 


Pulse 134 131 120 120


 


Resp 19 16 12 12


 


B/P (MAP) 102/85 (91) 93/74 (80) 105/90 (95) 108/68 (81)


 


Pulse Ox 94 94 94 94


 


O2 Delivery Room Air Room Air Room Air Room Air





 11/9/22 11/9/22  





 10:56 10:57  


 


Pulse 106 60  


 


Resp 12 12  


 


B/P (MAP) 88/64 (72) 108/71 (83)  


 


Pulse Ox 94 94  


 


O2 Delivery Room Air Room Air  








Patient was tilted to 75 degrees for [10] minutes, then returned to supine 

position, given [2] sublingual nitroglycerin tablets, then tilted again to 75 

degrees for [15] minutes. 





During test, patient was:  symptomatic (with dizziness and lightheadedness)


In Conclusion;:  Negative Tilt Table Test


Patient was noted to be borderline hypotensive throughout the test. She was 

symptomatic with dizziness and lightheadedness, but not syncope. She was 

instructed to increase fluid and salt intake, instructed to wear compression 

stockings. 











This is Brigitte Loving PA-C, as a scribe for Dr. Hernandez.











BRIGITTE WILSON   Nov 9, 2022 14:08

## 2022-11-16 ENCOUNTER — HOSPITAL ENCOUNTER (OUTPATIENT)
Dept: HOSPITAL 75 - CARD | Age: 32
End: 2022-11-16
Attending: INTERNAL MEDICINE
Payer: COMMERCIAL

## 2022-11-16 VITALS — SYSTOLIC BLOOD PRESSURE: 107 MMHG | DIASTOLIC BLOOD PRESSURE: 66 MMHG

## 2022-11-16 DIAGNOSIS — R00.2: ICD-10-CM

## 2022-11-16 DIAGNOSIS — R07.9: Primary | ICD-10-CM

## 2022-11-16 PROCEDURE — 93017 CV STRESS TEST TRACING ONLY: CPT

## 2022-11-16 NOTE — CARDIOLOGY STRESS TEST REPORT
Stress Test Report


Date of Procedure/Referring:


Date of Procedure:  Nov 16, 2022


PCP


Elly Pitts MD


Admitting Physician


Admitting Physician:


 








Attending Physician:


Yohannes Hernandez MD





Indications:


CP





Baseline Heart Rate:


78





Baseline Blood Pressure:


Blood Pressure Systolic:  107


Blood Pressure Diastolic:  66





Baseline EKG:


Baseline EKG:  NSR





Summary/Conclusion:


Summary:


In summary, the patient started exercising with a baseline heart rate, blood


pressure and EKG mentioned above





Patient was able to exercise for a total of 6 minutes on Thom protocol,  METs 

7.3


Maximum heart rate 171      


Maximum blood pressure 147/73


   


Stress EKG, Minimal nondiagnostic changes 


Recovery EKG   , Return to baseline 





Conclusion:


1.  Good exercise tolerance for a total of 6 minutes on Thom protocol, 7.3 

METs, achieving 91 percent of maximum expected heart rate


2.  Minimal nondiagnostic EKG changes with exercise returned to baseline during 

recovery


3.  No arrhythmia was noted





Copy


Copies To 1:   ELLY PITTS MD, BASHAR J MD              Nov 16, 2022 14:44

## 2023-06-19 ENCOUNTER — HOSPITAL ENCOUNTER (OUTPATIENT)
Dept: HOSPITAL 75 - RAD | Age: 33
End: 2023-06-19
Attending: STUDENT IN AN ORGANIZED HEALTH CARE EDUCATION/TRAINING PROGRAM
Payer: COMMERCIAL

## 2023-06-19 DIAGNOSIS — E04.1: Primary | ICD-10-CM

## 2023-06-19 PROCEDURE — 76536 US EXAM OF HEAD AND NECK: CPT

## 2023-06-19 NOTE — DIAGNOSTIC IMAGING REPORT
PROCEDURE: US Thyroid.



TECHNIQUE: Multiple real-time grayscale images were obtained of

the thyroid in various projections.



INDICATION:  Thyroid nodule



COMPARISON: 07/25/2017



Right and left lobes of thyroid gland measure 4.5 x 1.1 x 1.5 cm

and 4.3 x 1.0 x 1.5 cm, respectively. There is no evidence of

dominant mass. Approximately 0.3 cm hypoechoic nodule in the left

lobe of the thyroid gland in the midportion likely represents a

cyst. No periglandular abnormalities identified. No hyperemia

seen.



IMPRESSION: Essentially unremarkable thyroid ultrasound apart

from possible 0.3 cm left lobe cyst.



Dictated by: 



  Dictated on workstation # [id number]

## 2023-08-09 ENCOUNTER — HOSPITAL ENCOUNTER (OUTPATIENT)
Dept: HOSPITAL 75 - LAB | Age: 33
End: 2023-08-09
Attending: NURSE PRACTITIONER
Payer: COMMERCIAL

## 2023-08-09 DIAGNOSIS — L40.0: Primary | ICD-10-CM

## 2023-08-09 LAB
ALBUMIN SERPL-MCNC: 4.3 GM/DL (ref 3.2–4.5)
ALP SERPL-CCNC: 34 U/L (ref 40–136)
ALT SERPL-CCNC: 12 U/L (ref 0–55)
BASOPHILS # BLD AUTO: 0 10^3/UL (ref 0–0.1)
BASOPHILS NFR BLD AUTO: 0 % (ref 0–10)
BILIRUB SERPL-MCNC: 0.5 MG/DL (ref 0.1–1)
BUN/CREAT SERPL: 14
CALCIUM SERPL-MCNC: 8.8 MG/DL (ref 8.5–10.1)
CHLORIDE SERPL-SCNC: 109 MMOL/L (ref 98–107)
CO2 SERPL-SCNC: 22 MMOL/L (ref 21–32)
CREAT SERPL-MCNC: 0.87 MG/DL (ref 0.6–1.3)
EOSINOPHIL # BLD AUTO: 0.1 10^3/UL (ref 0–0.3)
EOSINOPHIL NFR BLD AUTO: 1 % (ref 0–10)
GFR SERPLBLD BASED ON 1.73 SQ M-ARVRAT: 89 ML/MIN
GLUCOSE SERPL-MCNC: 88 MG/DL (ref 70–105)
HCT VFR BLD CALC: 42 % (ref 35–52)
HGB BLD-MCNC: 13.6 G/DL (ref 11.5–16)
LYMPHOCYTES # BLD AUTO: 1.5 10^3/UL (ref 1–4)
LYMPHOCYTES NFR BLD AUTO: 30 % (ref 12–44)
MANUAL DIFFERENTIAL PERFORMED BLD QL: NO
MCH RBC QN AUTO: 30 PG (ref 25–34)
MCHC RBC AUTO-ENTMCNC: 33 G/DL (ref 32–36)
MCV RBC AUTO: 92 FL (ref 80–99)
MONOCYTES # BLD AUTO: 0.3 10^3/UL (ref 0–1)
MONOCYTES NFR BLD AUTO: 7 % (ref 0–12)
NEUTROPHILS # BLD AUTO: 3.1 10^3/UL (ref 1.8–7.8)
NEUTROPHILS NFR BLD AUTO: 62 % (ref 42–75)
PLATELET # BLD: 134 10^3/UL (ref 130–400)
PMV BLD AUTO: 12 FL (ref 9–12.2)
POTASSIUM SERPL-SCNC: 4.2 MMOL/L (ref 3.6–5)
PROT SERPL-MCNC: 7 GM/DL (ref 6.4–8.2)
SODIUM SERPL-SCNC: 140 MMOL/L (ref 135–145)
WBC # BLD AUTO: 5.1 10^3/UL (ref 4.3–11)

## 2023-08-09 PROCEDURE — 86480 TB TEST CELL IMMUN MEASURE: CPT

## 2023-08-09 PROCEDURE — 85025 COMPLETE CBC W/AUTO DIFF WBC: CPT

## 2023-08-09 PROCEDURE — 80053 COMPREHEN METABOLIC PANEL: CPT

## 2023-08-09 PROCEDURE — 36415 COLL VENOUS BLD VENIPUNCTURE: CPT
